# Patient Record
Sex: MALE | Race: AMERICAN INDIAN OR ALASKA NATIVE
[De-identification: names, ages, dates, MRNs, and addresses within clinical notes are randomized per-mention and may not be internally consistent; named-entity substitution may affect disease eponyms.]

---

## 2020-08-22 ENCOUNTER — HOSPITAL ENCOUNTER (EMERGENCY)
Dept: HOSPITAL 5 - ED | Age: 55
Discharge: HOME | End: 2020-08-22
Payer: SELF-PAY

## 2020-08-22 VITALS — SYSTOLIC BLOOD PRESSURE: 165 MMHG | DIASTOLIC BLOOD PRESSURE: 105 MMHG

## 2020-08-22 DIAGNOSIS — I10: ICD-10-CM

## 2020-08-22 DIAGNOSIS — R07.89: Primary | ICD-10-CM

## 2020-08-22 LAB
APTT BLD: 26.5 SEC. (ref 24.2–36.6)
BASOPHILS # (AUTO): 0 K/MM3 (ref 0–0.1)
BASOPHILS NFR BLD AUTO: 0.4 % (ref 0–1.8)
BUN SERPL-MCNC: 20 MG/DL (ref 9–20)
BUN/CREAT SERPL: 13 %
CALCIUM SERPL-MCNC: 9.4 MG/DL (ref 8.4–10.2)
EOSINOPHIL # BLD AUTO: 0.3 K/MM3 (ref 0–0.4)
EOSINOPHIL NFR BLD AUTO: 6.4 % (ref 0–4.3)
HCT VFR BLD CALC: 38.3 % (ref 35.5–45.6)
HEMOLYSIS INDEX: 7
HGB BLD-MCNC: 13.3 GM/DL (ref 11.8–15.2)
INR PPP: 0.96 (ref 0.87–1.13)
LYMPHOCYTES # BLD AUTO: 1.4 K/MM3 (ref 1.2–5.4)
LYMPHOCYTES NFR BLD AUTO: 31.5 % (ref 13.4–35)
MCHC RBC AUTO-ENTMCNC: 35 % (ref 32–34)
MCV RBC AUTO: 94 FL (ref 84–94)
MONOCYTES # (AUTO): 0.4 K/MM3 (ref 0–0.8)
MONOCYTES % (AUTO): 7.8 % (ref 0–7.3)
PLATELET # BLD: 239 K/MM3 (ref 140–440)
RBC # BLD AUTO: 4.06 M/MM3 (ref 3.65–5.03)

## 2020-08-22 PROCEDURE — 96366 THER/PROPH/DIAG IV INF ADDON: CPT

## 2020-08-22 PROCEDURE — 85025 COMPLETE CBC W/AUTO DIFF WBC: CPT

## 2020-08-22 PROCEDURE — 85610 PROTHROMBIN TIME: CPT

## 2020-08-22 PROCEDURE — 93005 ELECTROCARDIOGRAM TRACING: CPT

## 2020-08-22 PROCEDURE — 36415 COLL VENOUS BLD VENIPUNCTURE: CPT

## 2020-08-22 PROCEDURE — 80048 BASIC METABOLIC PNL TOTAL CA: CPT

## 2020-08-22 PROCEDURE — 71045 X-RAY EXAM CHEST 1 VIEW: CPT

## 2020-08-22 PROCEDURE — 84484 ASSAY OF TROPONIN QUANT: CPT

## 2020-08-22 PROCEDURE — 84132 ASSAY OF SERUM POTASSIUM: CPT

## 2020-08-22 PROCEDURE — 96365 THER/PROPH/DIAG IV INF INIT: CPT

## 2020-08-22 PROCEDURE — 99284 EMERGENCY DEPT VISIT MOD MDM: CPT

## 2020-08-22 PROCEDURE — 85730 THROMBOPLASTIN TIME PARTIAL: CPT

## 2020-08-22 PROCEDURE — 96375 TX/PRO/DX INJ NEW DRUG ADDON: CPT

## 2020-08-22 RX ADMIN — POTASSIUM CHLORIDE SCH MLS/HR: 10 INJECTION, SOLUTION INTRAVENOUS at 15:33

## 2020-08-22 RX ADMIN — POTASSIUM CHLORIDE SCH MLS/HR: 10 INJECTION, SOLUTION INTRAVENOUS at 13:37

## 2020-08-22 NOTE — XRAY REPORT
CHEST 1 VIEW



INDICATION / CLINICAL INFORMATION:

Chest Pain.



COMPARISON: 

None available.



FINDINGS:



SUPPORT DEVICES: None.

HEART / MEDIASTINUM: No significant abnormality. 

LUNGS / PLEURA: No significant pulmonary or pleural abnormality. No pneumothorax. 



ADDITIONAL FINDINGS: No significant additional findings.



IMPRESSION:

1. No acute findings.



Signer Name: Luis Alberto Rogel MD 

Signed: 8/22/2020 1:01 PM

Workstation Name: [id number]

## 2020-08-22 NOTE — EMERGENCY DEPARTMENT REPORT
<DIMITRI BRIGGS - Last Filed: 08/22/20 16:33>





ED Chest Pain HPI





- General


Chief Complaint: Chest Pain


Stated Complaint: CP/HEADACHE


Time Seen by Provider: 08/22/20 12:10





- Related Data


                                  Previous Rx's











 Medication  Instructions  Recorded  Last Taken  Type


 


amLODIPine 10 mg PO DAILY #30 tab 08/22/20 Unknown Rx











                                    Allergies











Allergy/AdvReac Type Severity Reaction Status Date / Time


 


No Known Allergies Allergy   Unverified 08/22/20 11:10














ED Past Medical Hx





- Medications


Home Medications: 


                                Home Medications











 Medication  Instructions  Recorded  Confirmed  Last Taken  Type


 


amLODIPine 10 mg PO DAILY #30 tab 08/22/20  Unknown Rx














ED Course





- Reevaluation(s)


Reevaluation #1: 





08/22/20 16:33


Patient's repeat potassium of greater than 3.  Patient is now stable for 

discharge.





ED Medical Decision Making





- Lab Data


Result diagrams: 


                                 08/22/20 12:15





                                 08/22/20 15:38





ED Disposition


Clinical Impression: 


 Chest pain, Malignant hypertension, Acute hypokalemia





Disposition: DC-01 TO HOME OR SELFCARE


Is pt being admited?: No


Does the pt Need Aspirin: No


Condition: Stable


Instructions:  Chest Pain (ED), Hypertension (ED)


Prescriptions: 


amLODIPine 10 mg PO DAILY #30 tab


Referrals: 


PRIMARY CARE,MD [Primary Care Provider] - 3-5 Days


[facility] [Provider Group] - 3-5 Days


Time of Disposition: 16:34





<ANAMIKA RIBEIRO - Last Filed: 08/23/20 12:04>





ED Chest Pain HPI





- General


Source: patient


Mode of arrival: Ambulatory


Limitations: No Limitations





- History of Present Illness


Initial Comments: 





Patient is 54 years old male with history of hypertension, noncompliant with his

medication.  Patient stated that he recently moved from [place] and he does not 

have a primary care physician here yet.  Patient presented to the ER complaining

of left-sided chest pain started probably 4 to 5 days ago.  Patient stated that 

his chest pain is mainly at night and when he checked his blood pressure to be 

high.  He also complaining of headache however he denied any neck pain, weakness

numbness or tingling sensation.  No bowel or bladder incontinence.  No ataxia, 

visual abnormalities or speech problem.


MD Complaint: chest pain


-: days(s) (3)


Onset: during rest


Pain Location: substernal


Pain Radiation: none


Severity: moderate


Quality: aching


Consistency: intermittent





Heart Score





- HEART Score


History: Slightly suspicious


EKG: Non-specific


Age: 45-65


Risk factors: 1-2 risk factors


Troponin: < normal limit


HEART Score: 3





- Critical Actions


Critical Actions: 0-3 pts:0.9-1.7%risk of adverse cardiac event.Candidate for 

discharge





ED Review of Systems


ROS: 


Stated complaint: CP/HEADACHE


Other details as noted in HPI





Comment: All other systems reviewed and negative


Constitutional: denies: chills, fever


Respiratory: denies: cough, shortness of breath, SOB with exertion


Cardiovascular: chest pain


Gastrointestinal: denies: abdominal pain, nausea, vomiting, diarrhea, 

constipation, hematemesis, melena, hematochezia


Musculoskeletal: denies: back pain


Neurological: denies: headache, weakness, numbness, paresthesias, confusion, 

abnormal gait, vertigo





ED Past Medical Hx





- Past Medical History


Previous Medical History?: Yes


Hx Hypertension: Yes





- Surgical History


Past Surgical History?: No





- Social History


Smoking Status: Never Smoker


Substance Use Type: None





ED Physical Exam





- General


Limitations: No Limitations


General appearance: alert, in no apparent distress





- Head


Head exam: Present: atraumatic, normocephalic, normal inspection





- Eye


Eye exam: Present: normal appearance





- ENT


ENT exam: Present: normal exam, normal orophraynx, mucous membranes moist, TM's 

normal bilaterally





- Neck


Neck exam: Present: normal inspection, full ROM.  Absent: tenderness, 

meningismus, lymphadenopathy, thyromegaly





- Respiratory


Respiratory exam: Present: normal lung sounds bilaterally





- Cardiovascular


Cardiovascular Exam: Present: regular rate, normal rhythm, normal heart sounds





- GI/Abdominal


GI/Abdominal exam: Present: soft, normal bowel sounds.  Absent: distended, 

tenderness, guarding, rebound, rigid, organomegaly, mass, bruit, pulsatile mass,

hernia





- Extremities Exam


Extremities exam: Present: normal inspection, full ROM, normal capillary refill.

 Absent: tenderness, pedal edema, joint swelling, calf tenderness





- Back Exam


Back exam: Present: normal inspection, full ROM.  Absent: CVA tenderness (R), 

CVA tenderness (L)





- Neurological Exam


Neurological exam: Present: alert, oriented X3, CN II-XII intact, normal gait, 

reflexes normal.  Absent: motor sensory deficit





- Psychiatric


Psychiatric exam: Present: normal mood





- Skin


Skin exam: Present: warm, intact, normal color





ED Course


                                   Vital Signs











  08/22/20 08/22/20 08/22/20





  12:06 15:35 16:41


 


Pulse Rate 84 85 


 


Respiratory 16 16 16





Rate   


 


Blood Pressure 181/117 173/112 165/105





[Left]   


 


O2 Sat by Pulse 96 96 96





Oximetry   














ROWDY score





- Rowdy Score


Age > 65: (0) No


Aspirin use within the Past 7 Days: (0) No


3 or more CAD Risk Factors: (0) No


2 or more Angina events in past 24 hrs: (0) No


Known CAD with more than 50% Stenosis: (0) No


Elevated Cardiac Markers: (0) No


ST Deviation Greater than 0.5mm: (0) No


ROWDY Score: 0





ED Medical Decision Making





- Lab Data


Result diagrams: 


                                 08/22/20 12:15





                                 08/22/20 15:38





- EKG Data


-: EKG Interpreted by Me


EKG shows normal: sinus rhythm


Rate: normal





- EKG Data


Interpretation: no acute changes





- Radiology Data


Radiology results: report reviewed





- Medical Decision Making





Patient is 54 years old male with history of hypertension, noncompliant with his

 medication.  Patient stated that he recently moved from [place] and he does not

 have a primary care physician here yet.  Patient presented to the ER 

complaining of left-sided chest pain started probably 4 to 5 days ago.  Patient 

stated that his chest pain is mainly at night and when he checked his blood 

pressure to be high.  He also complaining of headache however he denied any neck

 pain, weakness numbness or tingling sensation.  No bowel or bladder 

incontinence.  No ataxia, visual abnormalities or speech problem.





EKG showed normal sinus rhythm no ST elevation or depression.  Labs reviewed and

 is unremarkable except for potassium of 2.8 replaced with potassium chloride 20

 mEq IV and also patient received 40 mEq of K. Dur.  Patient blood pressure was 

174/118.  Patient received clonidine 0.2 mg which improved his blood pressure 

significantly.  Patient stated that his headache and chest pain completely 

resolved.  Patient stated that he was taking amlodipine 10 mg but he ran out of 

his medication.  Patient given prescription for amlodipine and also [facility] for follow-up.  Patient advised to return to the ER if he develop

 any new symptoms.


Critical care attestation.: 


If time is entered above; I have spent that time in minutes in the direct care 

of this critically ill patient, excluding procedure time.








ED Disposition


Is pt being admited?: No

## 2020-10-17 ENCOUNTER — HOSPITAL ENCOUNTER (EMERGENCY)
Dept: HOSPITAL 5 - ED | Age: 55
Discharge: STILL ADMITTED OR ADMINISTRATIVE | End: 2020-10-17
Payer: COMMERCIAL

## 2020-10-17 VITALS — DIASTOLIC BLOOD PRESSURE: 98 MMHG | SYSTOLIC BLOOD PRESSURE: 146 MMHG

## 2020-10-17 DIAGNOSIS — Z79.899: ICD-10-CM

## 2020-10-17 DIAGNOSIS — M62.82: ICD-10-CM

## 2020-10-17 DIAGNOSIS — I12.9: Primary | ICD-10-CM

## 2020-10-17 DIAGNOSIS — N18.30: ICD-10-CM

## 2020-10-17 DIAGNOSIS — R07.89: ICD-10-CM

## 2020-10-17 DIAGNOSIS — E87.6: ICD-10-CM

## 2020-10-17 DIAGNOSIS — Z98.890: ICD-10-CM

## 2020-10-17 LAB
ALBUMIN SERPL-MCNC: 4.6 G/DL (ref 3.9–5)
ALT SERPL-CCNC: 21 UNITS/L (ref 7–56)
APTT BLD: 26.5 SEC. (ref 24.2–36.6)
BASOPHILS # (AUTO): 0.1 K/MM3 (ref 0–0.1)
BASOPHILS NFR BLD AUTO: 1.4 % (ref 0–1.8)
BILIRUB DIRECT SERPL-MCNC: < 0.2 MG/DL (ref 0–0.2)
BILIRUB UR QL STRIP: (no result)
BLOOD UR QL VISUAL: (no result)
BUN SERPL-MCNC: 18 MG/DL (ref 9–20)
BUN SERPL-MCNC: 21 MG/DL (ref 9–20)
BUN/CREAT SERPL: 11 %
BUN/CREAT SERPL: 12 %
CALCIUM SERPL-MCNC: 9 MG/DL (ref 8.4–10.2)
CALCIUM SERPL-MCNC: 9.5 MG/DL (ref 8.4–10.2)
EOSINOPHIL # BLD AUTO: 0.1 K/MM3 (ref 0–0.4)
EOSINOPHIL NFR BLD AUTO: 1.2 % (ref 0–4.3)
HCT VFR BLD CALC: 39.5 % (ref 35.5–45.6)
HEMOLYSIS INDEX: 5
HEMOLYSIS INDEX: 7
HGB BLD-MCNC: 13.7 GM/DL (ref 11.8–15.2)
HYALINE CASTS #/AREA URNS LPF: 1 /LPF
INR PPP: 0.97 (ref 0.87–1.13)
LYMPHOCYTES # BLD AUTO: 1.4 K/MM3 (ref 1.2–5.4)
LYMPHOCYTES NFR BLD AUTO: 25.6 % (ref 13.4–35)
MCHC RBC AUTO-ENTMCNC: 35 % (ref 32–34)
MCV RBC AUTO: 94 FL (ref 84–94)
MONOCYTES # (AUTO): 0.5 K/MM3 (ref 0–0.8)
MONOCYTES % (AUTO): 9.1 % (ref 0–7.3)
PH UR STRIP: 7 [PH] (ref 5–7)
PLATELET # BLD: 246 K/MM3 (ref 140–440)
PROT UR STRIP-MCNC: (no result) MG/DL
RBC # BLD AUTO: 4.18 M/MM3 (ref 3.65–5.03)
RBC #/AREA URNS HPF: 2 /HPF (ref 0–6)
UROBILINOGEN UR-MCNC: < 2 MG/DL (ref ?–2)
WBC #/AREA URNS HPF: 1 /HPF (ref 0–6)

## 2020-10-17 PROCEDURE — 96375 TX/PRO/DX INJ NEW DRUG ADDON: CPT

## 2020-10-17 PROCEDURE — 99285 EMERGENCY DEPT VISIT HI MDM: CPT

## 2020-10-17 PROCEDURE — 85610 PROTHROMBIN TIME: CPT

## 2020-10-17 PROCEDURE — 82550 ASSAY OF CK (CPK): CPT

## 2020-10-17 PROCEDURE — 82553 CREATINE MB FRACTION: CPT

## 2020-10-17 PROCEDURE — 75635 CT ANGIO ABDOMINAL ARTERIES: CPT

## 2020-10-17 PROCEDURE — 96365 THER/PROPH/DIAG IV INF INIT: CPT

## 2020-10-17 PROCEDURE — 80076 HEPATIC FUNCTION PANEL: CPT

## 2020-10-17 PROCEDURE — 96368 THER/DIAG CONCURRENT INF: CPT

## 2020-10-17 PROCEDURE — 85730 THROMBOPLASTIN TIME PARTIAL: CPT

## 2020-10-17 PROCEDURE — 84484 ASSAY OF TROPONIN QUANT: CPT

## 2020-10-17 PROCEDURE — 71275 CT ANGIOGRAPHY CHEST: CPT

## 2020-10-17 PROCEDURE — 81001 URINALYSIS AUTO W/SCOPE: CPT

## 2020-10-17 PROCEDURE — 83735 ASSAY OF MAGNESIUM: CPT

## 2020-10-17 PROCEDURE — 96366 THER/PROPH/DIAG IV INF ADDON: CPT

## 2020-10-17 PROCEDURE — 36415 COLL VENOUS BLD VENIPUNCTURE: CPT

## 2020-10-17 PROCEDURE — 96376 TX/PRO/DX INJ SAME DRUG ADON: CPT

## 2020-10-17 PROCEDURE — 85025 COMPLETE CBC W/AUTO DIFF WBC: CPT

## 2020-10-17 PROCEDURE — 83880 ASSAY OF NATRIURETIC PEPTIDE: CPT

## 2020-10-17 PROCEDURE — 80048 BASIC METABOLIC PNL TOTAL CA: CPT

## 2020-10-17 PROCEDURE — 93005 ELECTROCARDIOGRAM TRACING: CPT

## 2020-10-17 PROCEDURE — 71045 X-RAY EXAM CHEST 1 VIEW: CPT

## 2020-10-17 RX ADMIN — POTASSIUM CHLORIDE SCH MLS/HR: 10 INJECTION, SOLUTION INTRAVENOUS at 16:43

## 2020-10-17 RX ADMIN — POTASSIUM CHLORIDE SCH MLS/HR: 10 INJECTION, SOLUTION INTRAVENOUS at 21:12

## 2020-10-17 RX ADMIN — POTASSIUM CHLORIDE SCH MLS/HR: 10 INJECTION, SOLUTION INTRAVENOUS at 18:09

## 2020-10-17 RX ADMIN — POTASSIUM CHLORIDE SCH MLS/HR: 10 INJECTION, SOLUTION INTRAVENOUS at 19:34

## 2020-10-17 NOTE — CAT SCAN REPORT
CTA CHEST WITH IV CONTRAST



INDICATION / CLINICAL INFORMATION:

Severe HTN CP to back.



TECHNIQUE:

Axial CT images were obtained through the chest after injection of IV contrast. 3 plane MIP and/or 3D
 reconstructions were produced. All CT scans at this location are performed using CT dose reduction f
or ALARA by means of automated exposure control. 



COMPARISON:

None available.



FINDINGS:

PULMONARY ARTERIES: No pulmonary emboli.

THORACIC AORTA: Prominent slightly ectatic thoracic aorta without evidence of aneurysm or dissection.
 

HEART: No significant abnormality.

CORONARY ARTERIES: No significant calcification.

PLEURA: No pleural effusion. No pneumothorax.

LYMPH NODES: No significant adenopathy.

LUNGS: No acute air space or interstitial disease. 



ADDITIONAL FINDINGS: None..



SKELETAL STRUCTURES: No significant osseous abnormality.



IMPRESSION:

1. No CT evidence for pulmonary embolism. 

2. No evidence of thoracic aortic dissection or aneurysm



Signer Name: Elliot Leal MD 

Signed: 10/17/2020 4:32 PM

Workstation Name: [id number]

## 2020-10-17 NOTE — XRAY REPORT
CHEST 1 VIEW 



INDICATION / CLINICAL INFORMATION:

Chest Pain.



COMPARISON: 

8/22/2020



FINDINGS:



SUPPORT DEVICES: None.

HEART / MEDIASTINUM: No significant abnormality. 

LUNGS / PLEURA: No significant pulmonary or pleural abnormality. No pneumothorax. 



ADDITIONAL FINDINGS: No significant additional findings.



IMPRESSION:

No acute disease or interval change from 8/22/2020



Signer Name: Miguel Jameson MD FACR 

Signed: 10/17/2020 3:52 PM

Workstation Name: [id number]

## 2020-10-17 NOTE — EVENT NOTE
Date: 10/17/20





55-year-old male presents to ED for evaluation of atypical chest pain.  Patient 

seen and evaluated in the emergency department.  Lab and imaging studies 

reviewed.  Patient treated" with chest pain protocol.  Cardiac enzymes EKG and 

telemetry monitoring were unremarkable and not indicative of acute ischemia.  

Patient treated with CT of the chest abdomen and pelvis.  Patient found to have 

incidental finding of left renal mass.  Patient acknowledges knowledge of renal 

mass mass.  Patient found the mass may be malignant in nature and requires f

urther outpatient follow-up.  Patient medically optimized and back to usual 

state of health.  Patient discharged back to custody of law enforcement 

instructed to follow-up with primary care physician within 3 to 5 days with 

blood pressure log.  Patient instructed to follow-up with cardiology as 

outpatient for further evaluation.





ED Physical Exam





- General


Limitations: No Limitations


General appearance: alert, in no apparent distress





- Head


Head exam: Present: atraumatic, normocephalic





- Eye


Eye exam: Present: normal appearance.  Absent: scleral icterus





- ENT


ENT exam: Present: mucous membranes moist





- Neck


Neck exam: Present: normal inspection





- Respiratory


Respiratory exam: Present: normal lung sounds bilaterally.  Absent: respiratory 

distress





- Cardiovascular


Cardiovascular Exam: Present: regular rate, normal rhythm.  Absent: systolic 

murmur, diastolic murmur, rubs, gallop





- GI/Abdominal


GI/Abdominal exam: Present: soft, normal bowel sounds.  Absent: distended, 

tenderness, guarding, rebound, rigid





- Rectal


Rectal exam: Present: deferred





- Extremities Exam


Extremities exam: Present: normal inspection.  Absent: calf tenderness





- Back Exam


Back exam: Present: normal inspection





- Neurological Exam


Neurological exam: Present: alert, oriented X3, CN II-XII intact.  Absent: motor

sensory deficit





- Psychiatric


Psychiatric exam: Present: normal affect, normal mood





- Skin


Skin exam: Present: warm, dry, intact, normal color.  Absent: rash





- Other


Other exam information: 


Strong dorsalis pedis and posterior pulses symmetrical bilaterally

## 2020-10-17 NOTE — CAT SCAN REPORT
CLINICAL DATA:



Severe HTN CP to back







TECHNICAL DATA:



Following dynamic intravenous nonionic contrast infusion, multiple axial helical overlapped CT  with 
multiplanar reconstructions were obtained.  All CT data was transferred to a 3D workstation for multi
planar reformation and 3D reconstruction under concurrent physician supervision.

  All CT scans at this location are performed using CT dose reduction for ALARA by means of automated
 exposure control. 



FINDINGS:



CTA ABDOMEN PELVIS:



The celiac axis is normal. The origin of the superior mesenteric artery is normal. There are single b
ilateral renal arteries, which are normal. The infrarenal abdominal aorta demonstrates atheroscleroti
c plaques. No evidence of stenosis or abdominal aortic aneurysm.



The bifurcation into the common iliac is well delineated with atherosclerotic plaques present without
 evidence of narrowing. The bifurcation into the internal and external iliac arteries demonstrate pat
ency without evidence of a hemodynamically significant lesion.



CT ABDOMEN PELVIS:



Noncontrast imaging of the upper abdomen fails to demonstrate abnormal calcifications, cholelithiasis
, or nephrolithiasis.



No focal parenchymal abnormalities are identified.  The gallbladder, pancreas, and kidneys are well i
deisi. There is a heterogeneous solid mass left kidney measuring 4.37 cm. Right adrenal mass is prese
nt measuring 2.3 cm in diameter. A small left adrenal nodule is present measuring 1.2 cm There is no 
evidence of biliary ductal dilatation.  The portal and hepatic veins are patent.  No definite intraab
dominal or retroperitoneal lymphadenopathy is identified.  The bowel gas pattern is nonspecific and n
onobstructive.  There is no evidence of free intraperitoneal air or free intraperitoneal fluid.



CT through the pelvis reveals the bladder to be well distended and smooth in contour.  There is no ev
idence of free air or free fluid within the pelvis.  No focal soft tissue mass lesions or lymphadenop
athy identified.



IMPRESSION:



1. Left renal mass worrisome for a neoplastic process

2. Right adrenal gland lesion worrisome for a neoplastic process



Signer Name: Elliot Leal MD 

Signed: 10/17/2020 4:38 PM

Workstation Name: [id number]

## 2020-10-17 NOTE — EMERGENCY DEPARTMENT REPORT
ED Chest Pain HPI





- General


Chief Complaint: Chest Pain


Stated Complaint: HYPERTENSION/LOW POTASSIUM


Time Seen by Provider: 10/17/20 15:02


Source: patient, EMS


Mode of arrival: Stretcher


Limitations: No Limitations





- History of Present Illness


Initial Comments: 


This is a 55-year-old male who is an inmate at [facility].  He states 

he has had intermittent chest pain since Thursday night.  He states on Thursday 

night it was associated with sweating nausea and shortness of breath.  Chest 

pain has been sharp and intermittent.  Sometimes it radiates to his back and 

bilaterally to his buttocks.  He states he has had chest pain before but not 

like this.  He has been [facility] 3 [facility] 1 of which is amlodipine 

another Tylenol and the third perhaps a diuretic.  He states he has been on 

lisinopril in the past but he was told to stop taking it.  He denies a prior 

history of venous thromboembolism or coronary artery disease.  He is not having 

active chest pain at the time of my encounter.





The patient was sent to this facility for evaluation of hypokalemia per se.  The

initial report did not involve chest pain.  However he presented to the 

emergency department with severely uncontrolled hypertension and a complaint of 

chest pain as above.





Patient states that on September 26 in [facility] he had next surgery.  

I believe he probably had an anterior cervical fusion as he states he had an 

incision on his neck and posteriorly.





MD Complaint: chest pain


-: Gradual, days(s)


Onset: during rest


Pain Location: substernal


Pain Radiation: other (As above described)


Severity: moderate


Quality: sharp


Consistency: intermittent


Improves With: nothing


Worsens With: nothing


re: nausea, diaphoresis, dyspnea, other (Above symptoms on Thursday night but 

not since)


Other Symptoms: denies: cough, fever, syncope


Treatments Prior to Arrival: other (As above indicated)


Aspirin use within the Past 7 Days: (1) Yes





- Related Data


                                Home Medications











 Medication  Instructions  Recorded  Confirmed  Last Taken


 


Acetaminophen [Non-Aspirin Pain 500 mg PO BID 10/17/20 10/17/20 Unknown





Relief]    


 


Aspirin [Aspirin BABY CHEW TAB] 81 mg PO QAM 10/17/20 10/17/20 Unknown


 


amLODIPine 5 mg PO DAILY 10/17/20 10/17/20 Unknown











                                    Allergies











Allergy/AdvReac Type Severity Reaction Status Date / Time


 


No Known Allergies Allergy   Verified 10/17/20 14:50














Heart Score





- HEART Score


History: Moderately suspicious


EKG: Non-specific


Age: 45-65


Risk factors: 1-2 risk factors


Troponin: < normal limit


HEART Score: 4





- Critical Actions


Critical Actions: 4-6 pts:12-16.6% risk of adverse cardiac event. Should be 

admitted





ED Review of Systems


ROS: 


Stated complaint: HYPERTENSION/LOW POTASSIUM


Other details as noted in HPI





Constitutional: denies: chills, fever


Eyes: denies: eye pain, eye discharge, vision change


ENT: denies: ear pain, throat pain


Respiratory: shortness of breath (As per HPI).  denies: cough, wheezing


Cardiovascular: chest pain.  denies: palpitations


Endocrine: no symptoms reported


Gastrointestinal: nausea (As per HPI).  denies: abdominal pain, diarrhea


Genitourinary: denies: urgency, dysuria


Musculoskeletal: denies: back pain, joint swelling, arthralgia


Skin: denies: rash, lesions


Neurological: denies: headache, weakness, paresthesias


Psychiatric: denies: anxiety, depression


Hematological/Lymphatic: denies: easy bleeding, easy bruising





ED Past Medical Hx





- Past Medical History


Hx Hypertension: Yes





- Surgical History


Additional Surgical History: NECK AND BACK SURGERY





- Social History


Smoking Status: Never Smoker


Substance Use Type: None





- Medications


Home Medications: 


                                Home Medications











 Medication  Instructions  Recorded  Confirmed  Last Taken  Type


 


Acetaminophen [Non-Aspirin Pain 500 mg PO BID 10/17/20 10/17/20 Unknown History





Relief]     


 


Aspirin [Aspirin BABY CHEW TAB] 81 mg PO QAM 10/17/20 10/17/20 Unknown History


 


amLODIPine 5 mg PO DAILY 10/17/20 10/17/20 Unknown History














ED Physical Exam





- General


Limitations: No Limitations


General appearance: alert, in no apparent distress





- Head


Head exam: Present: atraumatic, normocephalic





- Eye


Eye exam: Present: normal appearance.  Absent: scleral icterus





- ENT


ENT exam: Present: mucous membranes moist





- Neck


Neck exam: Present: normal inspection





- Respiratory


Respiratory exam: Present: normal lung sounds bilaterally.  Absent: respiratory 

distress





- Cardiovascular


Cardiovascular Exam: Present: regular rate, normal rhythm.  Absent: systolic 

murmur, diastolic murmur, rubs, gallop





- GI/Abdominal


GI/Abdominal exam: Present: soft, normal bowel sounds.  Absent: distended, 

tenderness, guarding, rebound, rigid





- Rectal


Rectal exam: Present: deferred





- Extremities Exam


Extremities exam: Present: normal inspection.  Absent: calf tenderness





- Back Exam


Back exam: Present: normal inspection





- Neurological Exam


Neurological exam: Present: alert, oriented X3, CN II-XII intact.  Absent: motor

sensory deficit





- Psychiatric


Psychiatric exam: Present: normal affect, normal mood





- Skin


Skin exam: Present: warm, dry, intact, normal color.  Absent: rash





- Other


Other exam information: 


Strong dorsalis pedis and posterior pulses symmetrical bilaterally








ED Course


                                   Vital Signs











  10/17/20 10/17/20





  14:30 16:05


 


Temperature 98.9 F 


 


Pulse Rate 99 H 98 H


 


Respiratory 24 





Rate  


 


Blood Pressure 192/127 164/114


 


O2 Sat by Pulse 98 





Oximetry  














- Reevaluation(s)


Reevaluation #1: 


Discussed with hospitalist.  I reviewed the patient's CT.  His aorta looks fine 

to be radiologist is still pending.  Blood pressure is responding to labetalol. 

Patient was given oral potassium and K riders now ordered.  He was started on 

fluid.  He was found to have rhabdo with a CK of about 1800 and troponin 

undetectable.  He has not been complaining of active chest pain in the emergency

department.


10/17/20 16:34





10/17/20 16:35








VIRAJ score





- Viraj Score


Age > 65: (0) No


Aspirin use within the Past 7 Days: (0) No


3 or more CAD Risk Factors: (0) No


2 or more Angina events in past 24 hrs: (0) No


Known CAD with more than 50% Stenosis: (0) No


Elevated Cardiac Markers: (0) No


ST Deviation Greater than 0.5mm: (0) No


VIRAJ Score: 0





ED Medical Decision Making





- Lab Data


Result diagrams: 


                                 10/17/20 15:02





                                 10/17/20 15:02








                         Laboratory Results - last 24 hr











  10/17/20 10/17/20 10/17/20





  15:02 15:02 15:07


 


WBC  5.5  


 


RBC  4.18  


 


Hgb  13.7  


 


Hct  39.5  


 


MCV  94  


 


MCH  33 H  


 


MCHC  35 H  


 


RDW  12.9 L  


 


Plt Count  246  


 


Lymph % (Auto)  25.6  


 


Mono % (Auto)  9.1 H  


 


Eos % (Auto)  1.2  


 


Baso % (Auto)  1.4  


 


Lymph # (Auto)  1.4  


 


Mono # (Auto)  0.5  


 


Eos # (Auto)  0.1  


 


Baso # (Auto)  0.1  


 


Seg Neutrophils %  62.7  


 


Seg Neutrophils #  3.5  


 


PT    13.1


 


INR    0.97


 


APTT    26.5


 


Carbon Dioxide   26 


 


Anion Gap   19 


 


BUN   21 H 


 


Creatinine   1.8 H 


 


Estimated GFR   48 


 


BUN/Creatinine Ratio   12 


 


Glucose   109 H 


 


Calcium   9.5 


 


Magnesium   


 


Total Bilirubin   


 


Direct Bilirubin   


 


Indirect Bilirubin   


 


AST   


 


ALT   


 


Alkaline Phosphatase   


 


Total Creatine Kinase   


 


CK-MB (CK-2)   


 


CK-MB (CK-2) Rel Index   


 


Troponin T   < 0.010 


 


NT-Pro-B Natriuret Pep   


 


Total Protein   


 


Albumin   


 


Albumin/Globulin Ratio   














  10/17/20





  15:07


 


WBC 


 


RBC 


 


Hgb 


 


Hct 


 


MCV 


 


MCH 


 


MCHC 


 


RDW 


 


Plt Count 


 


Lymph % (Auto) 


 


Mono % (Auto) 


 


Eos % (Auto) 


 


Baso % (Auto) 


 


Lymph # (Auto) 


 


Mono # (Auto) 


 


Eos # (Auto) 


 


Baso # (Auto) 


 


Seg Neutrophils % 


 


Seg Neutrophils # 


 


PT 


 


INR 


 


APTT 


 


Carbon Dioxide 


 


Anion Gap 


 


BUN 


 


Creatinine 


 


Estimated GFR 


 


BUN/Creatinine Ratio 


 


Glucose 


 


Calcium 


 


Magnesium  2.10


 


Total Bilirubin  0.40


 


Direct Bilirubin  < 0.2


 


Indirect Bilirubin  0.2


 


AST  41 H


 


ALT  21


 


Alkaline Phosphatase  105


 


Total Creatine Kinase  1881 H


 


CK-MB (CK-2)  6.5 H


 


CK-MB (CK-2) Rel Index  0.3


 


Troponin T 


 


NT-Pro-B Natriuret Pep  121.5


 


Total Protein  7.5


 


Albumin  4.6


 


Albumin/Globulin Ratio  1.6














- EKG Data


-: EKG Interpreted by Me


EKG shows normal: sinus rhythm, axis (Normal)


Rate: normal





- EKG Data


Interpretation: other (Patient probably has a coalescence of a U wave on T wave 

secondary to hyperkalemia.)


Critical Care Time: Yes


Critical care time in (mins) excluding proc time.: 60


Critical care attestation.: 


If time is entered above; I have spent that time in minutes in the direct care 

of this critically ill patient, excluding procedure time.








ED Disposition


Clinical Impression: 


 Malignant hypertension, Hypokalemia





Chest pain


Qualifiers:


 Chest pain type: unspecified Qualified Code(s): R07.9 - Chest pain, unspecified





Chronic renal insufficiency, stage III (moderate)


Qualifiers:


 Chronic kidney disease stage 3 subtype: unspecified whether 3a or 3b Qualified 

Code(s): N18.30 - Chronic kidney disease, stage 3 unspecified





Rhabdomyolysis


Qualifiers:


 Rhabdomyolysis type: non-traumatic Qualified Code(s): M62.82 - Rhabdomyolysis





Disposition: DC-09 OP ADMIT IP TO THIS HOSP


Is pt being admited?: Yes


Does the pt Need Aspirin: Yes


Condition: Stable


Instructions:  Hypertension (ED), Chest Pain (ED)


Referrals: 


PRIMARY CARE,MD [Primary Care Provider] - 3-5 Days


Time of Disposition: 16:37

## 2021-04-07 ENCOUNTER — HOSPITAL ENCOUNTER (EMERGENCY)
Dept: HOSPITAL 5 - ED | Age: 56
Discharge: HOME | End: 2021-04-07
Payer: COMMERCIAL

## 2021-04-07 VITALS — DIASTOLIC BLOOD PRESSURE: 92 MMHG | SYSTOLIC BLOOD PRESSURE: 136 MMHG

## 2021-04-07 DIAGNOSIS — Z79.82: ICD-10-CM

## 2021-04-07 DIAGNOSIS — I10: ICD-10-CM

## 2021-04-07 DIAGNOSIS — Z79.899: ICD-10-CM

## 2021-04-07 DIAGNOSIS — R51.9: Primary | ICD-10-CM

## 2021-04-07 PROCEDURE — 99283 EMERGENCY DEPT VISIT LOW MDM: CPT

## 2021-04-07 PROCEDURE — 70450 CT HEAD/BRAIN W/O DYE: CPT

## 2021-04-07 NOTE — CAT SCAN REPORT
CT head/brain wo con



INDICATION:

sudden onset headache x1 week + neck stiffness.



TECHNIQUE: Routine CT head. All CT scans at this location are performed using CT dose reduction for A
FAVIO by means of automated exposure control.



COMPARISON: 

None.



FINDINGS:



Intracranial: Gray-white matter differentiation is maintained. No intracranial hemorrhage. No extra a
xial collection. No hydrocephalus. No herniation.

Sinuses: Scattered small mucosal retention cysts. Paranasal sinuses and mastoid air cells are essenti
ally clear.

Orbits: Globes are intact. 

Calvarium: No acute fracture.





IMPRESSION:

1.  No acute intracranial abnormality.



Signer Name: Corey Lemus MD 

Signed: 4/7/2021 1:03 PM

Workstation Name: [id number]

## 2021-04-07 NOTE — EMERGENCY DEPARTMENT REPORT
ED General Adult HPI





- General


Chief complaint: Headache


Stated complaint: HEADACHE/COVID EXPOSURE


Time Seen by Provider: 04/07/21 12:21


Source: patient


Mode of arrival: Ambulatory


Limitations: No Limitations





- History of Present Illness


Initial comments: 


55-year-old male patient with history of hypertension presents to the emergency 

department with complaints of sudden onset occipital headache with associated 

neck stiffness starting 1 week ago.  Patient states the headache reached maximum

intensity on the same day the headache began.  For the last few days, the pain 

has decreased in intensity, and only occurs intermittently.  No preceding fall, 

trauma, or injury.  Patient is not anticoagulated.  He has been taking Tylenol 

for the headache with some relief. He does endorse known exposure to COVID-19 

and he has been in quarantine for the last 10 days.  He was reluctant to come to

the emergency department to have his headache evaluated because of COVID-19 

exposure.  Denies seizure, syncope, vision changes, rash, chest pain, shortness 

of breath, paresthesias, numbness, nausea, vomiting.  Denies all other 

complaints at this time.





- Related Data


                                Home Medications











 Medication  Instructions  Recorded  Confirmed  Last Taken


 


Acetaminophen [Non-Aspirin Pain 500 mg PO BID 10/17/20 10/17/20 Unknown





Relief]    


 


Aspirin [Aspirin BABY CHEW TAB] 81 mg PO QAM 10/17/20 10/17/20 Unknown


 


amLODIPine 5 mg PO DAILY 10/17/20 10/17/20 Unknown








                                  Previous Rx's











 Medication  Instructions  Recorded  Last Taken  Type


 


amLODIPine 10 mg PO DAILY #30 tab 10/17/20 Unknown Rx


 


hydroCHLOROthiazide [HCTZ] 25 mg PO ONCE #30 tablet 10/17/20 Unknown Rx











                                    Allergies











Allergy/AdvReac Type Severity Reaction Status Date / Time


 


No Known Allergies Allergy   Verified 10/17/20 14:50














ED Review of Systems


ROS: 


Stated complaint: HEADACHE/COVID EXPOSURE


Other details as noted in HPI





Other: 


GENERAL: Negative for fever, chills, weight change, anorexia, fatigue.


ENT: Negative for ear pain, difficulty hearing, sore throat, nasal congestion, 

epistaxis.


CARDIOVASCULAR: Negative for chest pain, palpitations, lower extremity swelling.

 


PULMONARY: Negative for cough, dyspnea, wheezing, orthopnea, cyanosis. 


GASTROINTESTINAL: Negative for abdominal pain, nausea, vomiting, diarrhea, 

constipation. 


MUSCULOSKELETAL: Positive for neck stiffness.


NEUROLOGICAL: Positive for headache.


INTEGUMENTARY: Negative for erythema, rash, diaphoresis, laceration, ecchymosis.

 


HEMATOLOGICAL: Negative for hemoptysis, hematemesis, hematochezia, hematuria.


PSYCHIATRIC: Negative for hallucinations, suicidal ideation, homicidal ideation,

 anxiety, depression.





ED Past Medical Hx





- Past Medical History


Hx Hypertension: Yes





- Surgical History


Additional Surgical History: NECK AND BACK SURGERY





- Social History


Smoking Status: Never Smoker


Substance Use Type: None





- Medications


Home Medications: 


                                Home Medications











 Medication  Instructions  Recorded  Confirmed  Last Taken  Type


 


Acetaminophen [Non-Aspirin Pain 500 mg PO BID 10/17/20 10/17/20 Unknown History





Relief]     


 


Aspirin [Aspirin BABY CHEW TAB] 81 mg PO QAM 10/17/20 10/17/20 Unknown History


 


amLODIPine 5 mg PO DAILY 10/17/20 10/17/20 Unknown History


 


amLODIPine 10 mg PO DAILY #30 tab 10/17/20  Unknown Rx


 


hydroCHLOROthiazide [HCTZ] 25 mg PO ONCE #30 tablet 10/17/20  Unknown Rx














ED Physical Exam





- General


Limitations: No Limitations





- Other


Other exam information: 


General: Awake and alert. No acute distress. 


Head: Atraumatic, normocephalic.


Eyes: EOMI. Pupils are equal and round. Normal sclera and conjunctiva. 


ENT: Oral mucosa is moist. Normal pharyngeal exam.


Neck: Supple. No lymphadenopathy.


Pulmonary: No respiratory distress. Clear to auscultation bilaterally. 


Cardiac: Regular rate and rhythm. Pulses are palpable and equal bilaterally. No 

lower extremity cyanosis or edema. 


Skin: Warm and dry. No rashes. 


Abdomen: Soft, non-tender, non-protuberant. No guarding, rigidity, or rebound. 

Bowel sounds are normal. No organomegaly or masses noted. 


Back: Normal alignment. No CVA tenderness.


Extremities: Symmetrical. Full range of motion intact. 


Neurological: Alert and oriented, appropriately interactive, no focal deficits. 

 GCS 15.  Strength and sensation intact throughout.  Cranial nerves II-XII 

grossly intact.  Normal cerebellar finger-nose exam.  Ambulatory without 

assistance.


Psych: Cooperative. Appropriate mood and affect. Speech is evenly metered. 

Thoughts are logically construed.








ED Course


                                   Vital Signs











  04/07/21





  11:18


 


Temperature 97.9 F


 


Pulse Rate 94 H


 


Respiratory 16





Rate 


 


Blood Pressure 136/92


 


O2 Sat by Pulse 98





Oximetry 














ED Medical Decision Making





- Medical Decision Making


Differential diagnosis including but not limited to: tension headache, migraine 

headache, cluster headache, subarachnoid hemorrhage, space-occupying lesion, 

intracranial hemorrhage, cervical strain/sprain





Patient presents to the emergency department with complaints of nontraumatic 

headache for over 1 week.  Risk factors for subarachnoid hemorrhage include: 

age, history of hypertension, sudden onset headache reaching maximal intensity 

within hours of onset, and associated neck stiffness.  These symptoms have la

rgely resolved; he he was sent to the emergency department to obtain written 

documentation of his 10-day quarantine.  He was reluctant to come to the 

emergency department to have the symptoms evaluated sooner because of the COVID-

19 pandemic.  Vital signs are stable.  Physical exam is unremarkable.  

Neurological exam is nonfocal.  However, he is not an appropriate candidate for 

risk stratification using the [place] subarachnoid hemorrhage rule due to the 

above risk factors.  Therefore, CT head was obtained for further evaluation.





On evaluation, patient remains stable.  Repeat neurological exam remains 

nonfocal.  CT head without acute process.  It was explained to the patient that 

CT of the head is not 100% sensitive for detecting subarachnoid hemorrhage >6 

hours after onset of symptoms, and a lumbar puncture is necessary to 

definitively rule out this diagnosis. Shared decision making has been 

implemented and patient has politely refused lumbar puncture.  The risks and 

benefits of performing lumbar puncture as well as bypassing lumbar puncture have

 both been discussed. The patients ability to make an informed decision has 

been assessed and it has been determined that the patient has the capacity to 

comprehend the information about their current medical condition and appreciate 

the impact of the disease and the consequence of various options for treatment. 

The patient possesses the ability to evaluate all treatment options, compare the

 risks and benefits of each option, communicate this choice in a consistent 

manner over time, and is able to make choices that are not irrational. The 

patient has had an opportunity to express his preferences and goals of care. 

Patient was given the opportunity to ask questions, all of which were 

satisfactorily answered.  Patient will be discharged home in stable condition 

with referral to primary care provider for close outpatient follow-up.  Strict 

return precautions provided. 


Critical care attestation.: 


If time is entered above; I have spent that time in minutes in the direct care 

of this critically ill patient, excluding procedure time.








ED Disposition


Clinical Impression: 


 Occipital headache





Disposition: DC-01 TO HOME OR SELFCARE


Is pt being admited?: No


Does the pt Need Aspirin: No


Condition: Stable


Instructions:  Cervicogenic Headache


Additional Instructions: 


Take Tylenol every 4 hours as needed for pain.


Follow-up with your primary care provider this week.  Call tomorrow to schedule 

an appointment.


Return to the emergency department immediately for new or worsening symptoms.


Specifically, return to the emergency department immediately for fever, 

increased neck stiffness, worsening headache, nausea/vomiting, numbness, 

tingling, seizures, loss of consciousness, mental status changes, vision gan

ges, or any other concerns.


Referrals: 


EDWARD MURGUIA MD [Primary Care Provider] - 3-5 Days


Forms:  Work/School Release Form(ED)


Time of Disposition: 13:15